# Patient Record
Sex: FEMALE | URBAN - METROPOLITAN AREA
[De-identification: names, ages, dates, MRNs, and addresses within clinical notes are randomized per-mention and may not be internally consistent; named-entity substitution may affect disease eponyms.]

---

## 2023-10-05 ENCOUNTER — APPOINTMENT (OUTPATIENT)
Dept: URBAN - METROPOLITAN AREA CLINIC 227 | Age: 28
Setting detail: DERMATOLOGY
End: 2023-10-05

## 2023-10-05 DIAGNOSIS — T88.7XX: ICD-10-CM

## 2023-10-05 PROBLEM — T88.7XXA UNSPECIFIED ADVERSE EFFECT OF DRUG OR MEDICAMENT, INITIAL ENCOUNTER: Status: ACTIVE | Noted: 2023-10-05

## 2023-10-05 PROCEDURE — OTHER COUNSELING: OTHER

## 2023-10-05 PROCEDURE — OTHER PRESCRIPTION MEDICATION MANAGEMENT: OTHER

## 2023-10-05 PROCEDURE — OTHER PRESCRIPTION SAMPLES PROVIDED: OTHER

## 2023-10-05 PROCEDURE — OTHER MIPS QUALITY: OTHER

## 2023-10-05 PROCEDURE — OTHER PRESCRIPTION: OTHER

## 2023-10-05 PROCEDURE — 99203 OFFICE O/P NEW LOW 30 MIN: CPT

## 2023-10-05 RX ORDER — PREDNISONE 10 MG/1
TABLET ORAL
Qty: 40 | Refills: 0 | Status: ERX | COMMUNITY
Start: 2023-10-05

## 2023-10-05 RX ORDER — HALOBETASOL PROPIONATE 0.5 MG/G
AEROSOL, FOAM TOPICAL
Qty: 50 | Refills: 2 | Status: ERX | COMMUNITY
Start: 2023-10-05

## 2023-10-05 ASSESSMENT — LOCATION ZONE DERM
LOCATION ZONE: TRUNK
LOCATION ZONE: ARM

## 2023-10-05 ASSESSMENT — LOCATION DETAILED DESCRIPTION DERM
LOCATION DETAILED: LEFT ANTECUBITAL SKIN
LOCATION DETAILED: SUPERIOR THORACIC SPINE
LOCATION DETAILED: PERIUMBILICAL SKIN
LOCATION DETAILED: LEFT ANTERIOR SHOULDER
LOCATION DETAILED: RIGHT LATERAL BREAST 6-7:00 REGION
LOCATION DETAILED: RIGHT ANTECUBITAL SKIN
LOCATION DETAILED: RIGHT ANTERIOR SHOULDER
LOCATION DETAILED: LEFT MEDIAL BREAST 6-7:00 REGION

## 2023-10-05 ASSESSMENT — LOCATION SIMPLE DESCRIPTION DERM
LOCATION SIMPLE: LEFT BREAST
LOCATION SIMPLE: LEFT UPPER ARM
LOCATION SIMPLE: ABDOMEN
LOCATION SIMPLE: LEFT SHOULDER
LOCATION SIMPLE: RIGHT BREAST
LOCATION SIMPLE: RIGHT SHOULDER
LOCATION SIMPLE: RIGHT UPPER ARM
LOCATION SIMPLE: UPPER BACK

## 2023-10-05 NOTE — HPI: RASH
What Type Of Note Output Would You Prefer (Optional)?: Standard Output
How Severe Is Your Rash?: mild
Is This A New Presentation, Or A Follow-Up?: Rash
Additional History: Pt reports that rash started after she gave birth 2 weeks ago. She noted that prior to birth she had a small rash on her belly. At that time her OB diagnosed her with PUPPP and she was given TAC and her rash resolved.